# Patient Record
Sex: FEMALE | Race: WHITE | Employment: OTHER | URBAN - METROPOLITAN AREA
[De-identification: names, ages, dates, MRNs, and addresses within clinical notes are randomized per-mention and may not be internally consistent; named-entity substitution may affect disease eponyms.]

---

## 2019-08-13 ENCOUNTER — HOSPITAL ENCOUNTER (OUTPATIENT)
Age: 69
Discharge: HOME | End: 2019-08-13
Attending: EMERGENCY MEDICINE
Payer: COMMERCIAL

## 2019-08-13 VITALS
SYSTOLIC BLOOD PRESSURE: 157 MMHG | HEART RATE: 67 BPM | DIASTOLIC BLOOD PRESSURE: 84 MMHG | RESPIRATION RATE: 18 BRPM | TEMPERATURE: 98 F | HEIGHT: 64 IN | OXYGEN SATURATION: 96 % | WEIGHT: 176 LBS | BODY MASS INDEX: 30.05 KG/M2
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
## 2019-08-13 DIAGNOSIS — S71.151A DOG BITE OF RIGHT THIGH, INITIAL ENCOUNTER: Primary | ICD-10-CM

## 2019-08-13 PROCEDURE — 90471 IMMUNIZATION ADMIN: CPT

## 2019-08-13 PROCEDURE — 99203 OFFICE O/P NEW LOW 30 MIN: CPT

## 2019-08-13 PROCEDURE — 99204 OFFICE O/P NEW MOD 45 MIN: CPT

## 2019-08-13 RX ORDER — AMOXICILLIN AND CLAVULANATE POTASSIUM 875; 125 MG/1; MG/1
1 TABLET, FILM COATED ORAL 2 TIMES DAILY
Qty: 10 TABLET | Refills: 0 | Status: SHIPPED | OUTPATIENT
Start: 2019-08-13 | End: 2019-08-18

## 2019-08-14 NOTE — ED PROVIDER NOTES
Patient Seen in: [facility] In [address] [place] [address]    History   Patient presents with:  Bite (integumentary)    Stated Complaint: dog bite     HPI    The patient is a 79-year-old female with no significant past medical history presents now wit tenderness  Skin: Small puncture wound to the lateral aspect of the right proximal thigh. No laceration. ED Course   Labs Reviewed - No data to display       We will update the patient's tetanus and initiate treatment with Augmentin.     ANDREWS Naranjo

## 2019-08-14 NOTE — ED NOTES
Wound rt hip washed and bacitracin oint Band-Aid applied boostrix given disharge inst and follow up care reviewed.  Unable to fill any info for report inst if she sees dog again, can go to the police department and make report

## 2021-07-19 ENCOUNTER — HOSPITAL ENCOUNTER (OUTPATIENT)
Age: 71
Discharge: HOME | End: 2021-07-19
Payer: MEDICARE

## 2021-07-19 VITALS
WEIGHT: 174 LBS | HEART RATE: 66 BPM | DIASTOLIC BLOOD PRESSURE: 63 MMHG | TEMPERATURE: 99 F | SYSTOLIC BLOOD PRESSURE: 148 MMHG | RESPIRATION RATE: 18 BRPM | OXYGEN SATURATION: 96 % | HEIGHT: 64 IN | BODY MASS INDEX: 29.71 KG/M2

## 2021-07-19 DIAGNOSIS — H61.23 BILATERAL IMPACTED CERUMEN: Primary | ICD-10-CM

## 2021-07-19 DIAGNOSIS — H60.502 ACUTE OTITIS EXTERNA OF LEFT EAR, UNSPECIFIED TYPE: ICD-10-CM

## 2021-07-19 PROCEDURE — 99213 OFFICE O/P EST LOW 20 MIN: CPT | Performed by: NURSE PRACTITIONER

## 2021-07-19 RX ORDER — OFLOXACIN 3 MG/ML
10 SOLUTION AURICULAR (OTIC) DAILY
Qty: 1 EACH | Refills: 0 | Status: SHIPPED | OUTPATIENT
Start: 2021-07-19 | End: 2021-07-26

## 2021-07-19 NOTE — ED PROVIDER NOTES
Patient presents with:  Ear Problem Pain      HPI:     Jay Monet is a 80year old female who presents with a chief complaint of discomfort to the right ear with muffled hearing for the past few days. She is concerned about wax impaction.   No fevers Worried About [facility] in the Last Year:       Ran Out of Food in the Last Year:   Transportation Needs:       Lack of Transportation (Medical):       Lack of Transportation (Non-Medical):   Physical Activity:       Days of Exercise per Week: Sig: Place 10 drops into the left ear daily for 7 days. Dispense:  1 each          Refill:  0      Labs performed this visit:  No results found for this or any previous visit (from the past 10 hour(s)).     MDM:  Both ears were irrigated with warm

## 2023-09-19 ENCOUNTER — OFFICE VISIT (OUTPATIENT)
Dept: OTOLARYNGOLOGY | Facility: CLINIC | Age: 73
End: 2023-09-19

## 2023-09-19 VITALS — BODY MASS INDEX: 29.37 KG/M2 | HEIGHT: 64 IN | WEIGHT: 172 LBS

## 2023-09-19 DIAGNOSIS — R09.82 POSTNASAL DRIP: ICD-10-CM

## 2023-09-19 DIAGNOSIS — R05.2 SUBACUTE COUGH: Primary | ICD-10-CM

## 2023-09-19 PROCEDURE — 99203 OFFICE O/P NEW LOW 30 MIN: CPT | Performed by: SPECIALIST

## 2023-09-19 RX ORDER — ESTRADIOL 0.1 MG/G
CREAM VAGINAL
COMMUNITY
Start: 2023-04-27

## 2023-09-19 RX ORDER — ALPRAZOLAM 0.25 MG/1
TABLET ORAL
COMMUNITY

## 2023-09-19 RX ORDER — IPRATROPIUM BROMIDE 42 UG/1
2 SPRAY, METERED NASAL 3 TIMES DAILY
Qty: 15 ML | Refills: 3 | Status: SHIPPED | OUTPATIENT
Start: 2023-09-19

## 2023-09-19 RX ORDER — NYSTATIN 100000 [USP'U]/G
POWDER TOPICAL
COMMUNITY

## 2023-09-19 RX ORDER — DEXAMETHASONE 6 MG/1
1 TABLET ORAL DAILY
COMMUNITY

## 2023-09-19 RX ORDER — MONTELUKAST SODIUM 10 MG/1
10 TABLET ORAL NIGHTLY
Qty: 30 TABLET | Refills: 3 | Status: SHIPPED | OUTPATIENT
Start: 2023-09-19

## 2023-09-19 RX ORDER — TRIAMTERENE AND HYDROCHLOROTHIAZIDE 37.5; 25 MG/1; MG/1
1 CAPSULE ORAL DAILY
COMMUNITY

## 2023-09-19 RX ORDER — PANTOPRAZOLE SODIUM 40 MG/1
1 TABLET, DELAYED RELEASE ORAL DAILY
COMMUNITY

## 2023-09-20 NOTE — PATIENT INSTRUCTIONS
I placed you on a trial of Singulair for the cough and postnasal drip. I also placed you on Atrovent nasal spray 2 sprays to each nostril up to 3 times daily. I could not see the results of your chest x-ray and care everywhere. We also must consider the Altace is a cause of your cough. Follow-up in 3 to 4 weeks time, sooner if problems.

## 2023-10-17 ENCOUNTER — OFFICE VISIT (OUTPATIENT)
Dept: OTOLARYNGOLOGY | Facility: CLINIC | Age: 73
End: 2023-10-17

## 2023-10-17 VITALS — BODY MASS INDEX: 29.37 KG/M2 | WEIGHT: 172 LBS | HEIGHT: 64 IN

## 2023-10-17 DIAGNOSIS — R09.82 POSTNASAL DRIP: ICD-10-CM

## 2023-10-17 DIAGNOSIS — R05.2 SUBACUTE COUGH: Primary | ICD-10-CM

## 2023-10-17 PROCEDURE — 99213 OFFICE O/P EST LOW 20 MIN: CPT | Performed by: SPECIALIST

## 2023-10-17 NOTE — PATIENT INSTRUCTIONS
Continue Singulair until at least the first frost.  Can continue to use the Atrovent nasal spray as needed for the postnasal drip. If you stop the Singulair I would restart in the spring he has very likely the cough is allergic in nature and those symptoms will return at that point in time. Call or follow-up with me with any additional questions or problems.

## 2025-07-05 ENCOUNTER — APPOINTMENT (OUTPATIENT)
Dept: ULTRASOUND IMAGING | Age: 75
End: 2025-07-05
Attending: NURSE PRACTITIONER
Payer: MEDICARE

## 2025-07-05 ENCOUNTER — HOSPITAL ENCOUNTER (OUTPATIENT)
Age: 75
Discharge: HOME | End: 2025-07-05
Attending: NURSE PRACTITIONER
Payer: MEDICARE

## 2025-07-05 VITALS
TEMPERATURE: 99 F | DIASTOLIC BLOOD PRESSURE: 66 MMHG | RESPIRATION RATE: 18 BRPM | HEART RATE: 60 BPM | OXYGEN SATURATION: 97 % | SYSTOLIC BLOOD PRESSURE: 132 MMHG

## 2025-07-05 DIAGNOSIS — M79.661 PAIN OF RIGHT LOWER LEG: Primary | ICD-10-CM

## 2025-07-05 PROCEDURE — 93971 EXTREMITY STUDY: CPT | Performed by: RADIOLOGY

## 2025-07-05 PROCEDURE — 99203 OFFICE O/P NEW LOW 30 MIN: CPT | Performed by: NURSE PRACTITIONER

## 2025-07-05 NOTE — ED PROVIDER NOTES
Patient Seen in: [facility]       The following individual(s) verbally consented to be recorded using ambient AI listening technology and understand that they can each withdraw their consent to this listening technology at any point by asking the clinician to turn off or pause the recording:    Patient name: Nadja Anthony      History  Chief Complaint   Patient presents with    Leg Pain     Stated Complaint: Bump behind Right Knee and Now Bump is moving    Subjective:   HPI     Nadja Anthony is a 75 year old female with hypertension and high cholesterol who presents with right leg pain.    She developed pain in the back of her right leg. The pain radiates down the leg and worsens with certain movements, such as turning her leg. She describes the pain as shooting when she turns her leg in a specific way.    The symptoms began the morning after a flight on Wednesday night. She has not experienced similar symptoms in the past and denies any history of blood clots. She took Advil last night for the pain, which provided some relief.    No chest pain or shortness of breath, but she feels anxious about the situation. There is no numbness currently, although she did experience transient numbness in her toes.    Her past medical history includes hypertension and high cholesterol. She is not on any blood-thinning medications and does not smoke. She is not on hormone therapy and takes Zoloft.  Denies injury      Objective:     Past Medical History:    Hyperlipidemia    Unspecified essential hypertension              Past Surgical History:   Procedure Laterality Date    Cataract      Removal gallbladder                  Social History     Socioeconomic History    Marital status:    Tobacco Use    Smoking status: Never    Smokeless tobacco: Never   Vaping Use    Vaping status: Never Used   Substance and Sexual Activity    Alcohol use: Not Currently     Comment: seldom    Drug use: Never   Other Topics  Concern    Caffeine Concern Yes     Comment: tea              Review of Systems    Positive for stated complaint: Bump behind Right Knee and Now Bump is moving  Other systems are as noted in HPI.  Constitutional and vital signs reviewed.      All other systems reviewed and negative except as noted above.    Physical Exam    ED Triage Vitals [07/05/25 1121]   BP (!) 163/69   Pulse 66   Resp 18   Temp 98.7 °F (37.1 °C)   Temp src    SpO2 97 %   O2 Device        Current Vitals:   Vital Signs  BP: 132/66  Pulse: 60  Resp: 18  Temp: 98.7 °F (37.1 °C)    Oxygen Therapy  SpO2: 97 %            Physical Exam  Vitals and nursing note reviewed.   Constitutional:       General: She is not in acute distress.     Appearance: Normal appearance. She is not ill-appearing or toxic-appearing.   HENT:      Head: Normocephalic and atraumatic.      Nose: Nose normal.      Mouth/Throat:      Mouth: Mucous membranes are moist.      Pharynx: Oropharynx is clear.   Eyes:      Pupils: Pupils are equal, round, and reactive to light.   Cardiovascular:      Rate and Rhythm: Normal rate and regular rhythm.      Pulses: Normal pulses.   Pulmonary:      Effort: Pulmonary effort is normal. No respiratory distress.      Breath sounds: Normal breath sounds.      Comments: Lungs clear.  No adventitious lung sounds.  No distress.  No hypoxia.  Pulse ox 97% ra. Which is normal    Abdominal:      General: Abdomen is flat.      Palpations: Abdomen is soft.   Musculoskeletal:         General: No signs of injury. Normal range of motion.      Cervical back: Normal range of motion and neck supple.      Right lower leg: No edema.      Left lower leg: No edema.      Comments: Complaining of pain to the proximal portion of the right lateral lower leg.  Nontender.  No swelling.  No erythema.  Varicose vein noted to that area  Negative Homans' sign  CMS intact   Skin:     General: Skin is warm and dry.      Capillary Refill: Capillary refill takes less than 2  seconds.   Neurological:      General: No focal deficit present.      Mental Status: She is alert and oriented to person, place, and time.      GCS: GCS eye subscore is 4. GCS verbal subscore is 5. GCS motor subscore is 6.   Psychiatric:         Mood and Affect: Mood normal.         Behavior: Behavior normal.         Thought Content: Thought content normal.         Judgment: Judgment normal.             ED Course  Labs Reviewed - No data to display  US VENOUS DOPPLER LEG RIGHT - DIAG IMG (CPT=93971)  Result Date: 7/5/2025  CONCLUSION: No right lower extremity DVT. Electronically Verified and Signed by Attending Radiologist: Isra Melgar MD 7/5/2025 12:46 PM Workstation: [employer]     [facility]           Medical Decision Making  Differential diagnoses reflecting the complexity of care include: Right leg pain, DVT, muscle strain, varicose vein  Patient with right lower extremity pain after recent air travel  Well-appearing on exam.  Benign extremity exam.  No swelling, erythema, tenderness.  Negative Homans' sign.  No chest pain or shortness of breath.  Ultrasound of the right leg is negative for DVT  No bony tenderness.  No history of trauma.  No suspicion for fracture.  No indication for x-ray today  Discussed muscle pain versus varicose vein pain    Plan of Care: Continue Advil as needed for pain.  Compression stockings.  Schedule follow-up for recheck with primary physician    Results and plan of care discussed with the patient/family. They are in agreement with discharge. They understand to follow up with their primary doctor or the referral physician for further evaluation, especially if no improvement.  Also discussed the limitations of immediate care, patient is aware that if symptoms are worse they should go to the emergency room. Verbal and written discharge instructions were given.     My independent interpretation of studies of: No DVT noted on right lower extremity ultrasound  Diagnostic tests and  medications considered but not ordered were: No indication for x-ray today  Shared decision making was done by: Patient agreeable to ultrasound  Comorbidities that add complexity to management include: Hypertension, high cholesterol  External chart review was done and was noted: Reviewed, routine primary care, being followed for arthritis  History obtained by an independent source was from: N/A  Discussions and management was done with: N/A  Social determinants of health that affect care: Age              Problems Addressed:  Pain of right lower leg: acute illness or injury    Amount and/or Complexity of Data Reviewed  Radiology: ordered and independent interpretation performed. Decision-making details documented in ED Course.    Risk  OTC drugs.        Disposition and Plan     Clinical Impression:  1. Pain of right lower leg         Disposition:  Discharge  7/5/2025 12:56 pm    Follow-up:  Fernando Peralta DO  [address] [address] [phone]                Medications Prescribed:  Discharge Medication List as of 7/5/2025 12:57 PM                Supplementary Documentation:

## 2025-07-05 NOTE — DISCHARGE INSTRUCTIONS
No blood clot noted on the ultrasound.  Continue Advil as needed for pain.  Try wearing compression stockings.  Schedule follow-up with your primary physician

## 2025-08-25 ENCOUNTER — HOSPITAL ENCOUNTER (OUTPATIENT)
Age: 75
Discharge: HOME | End: 2025-08-25

## 2025-08-25 VITALS
RESPIRATION RATE: 18 BRPM | DIASTOLIC BLOOD PRESSURE: 67 MMHG | TEMPERATURE: 98 F | OXYGEN SATURATION: 98 % | SYSTOLIC BLOOD PRESSURE: 141 MMHG | HEART RATE: 70 BPM

## 2025-08-25 DIAGNOSIS — N30.01 ACUTE CYSTITIS WITH HEMATURIA: Primary | ICD-10-CM

## 2025-08-25 LAB
BILIRUB UR QL STRIP: NEGATIVE
CLARITY UR: CLEAR
GLUCOSE UR STRIP-MCNC: NEGATIVE MG/DL
KETONES UR STRIP-MCNC: NEGATIVE MG/DL
NITRITE UR QL STRIP: NEGATIVE
PH UR STRIP: 6.5
PROT UR STRIP-MCNC: NEGATIVE MG/DL
SP GR UR STRIP: 1.01
UROBILINOGEN UR STRIP-ACNC: <2 MG/DL

## 2025-08-25 PROCEDURE — 81002 URINALYSIS NONAUTO W/O SCOPE: CPT | Performed by: PHYSICIAN ASSISTANT

## 2025-08-25 PROCEDURE — 99213 OFFICE O/P EST LOW 20 MIN: CPT | Performed by: PHYSICIAN ASSISTANT

## 2025-08-25 RX ORDER — CEPHALEXIN 500 MG/1
500 CAPSULE ORAL 2 TIMES DAILY
Qty: 14 CAPSULE | Refills: 0 | Status: SHIPPED | OUTPATIENT
Start: 2025-08-25 | End: 2025-09-01